# Patient Record
Sex: FEMALE | Race: WHITE
[De-identification: names, ages, dates, MRNs, and addresses within clinical notes are randomized per-mention and may not be internally consistent; named-entity substitution may affect disease eponyms.]

---

## 2020-01-20 ENCOUNTER — HOSPITAL ENCOUNTER (INPATIENT)
Dept: HOSPITAL 47 - 4FBP | Age: 25
LOS: 1 days | Discharge: HOME | End: 2020-01-21
Attending: OBSTETRICS & GYNECOLOGY | Admitting: OBSTETRICS & GYNECOLOGY
Payer: COMMERCIAL

## 2020-01-20 VITALS — RESPIRATION RATE: 16 BRPM

## 2020-01-20 DIAGNOSIS — Z82.49: ICD-10-CM

## 2020-01-20 DIAGNOSIS — Z3A.39: ICD-10-CM

## 2020-01-20 DIAGNOSIS — Z90.5: ICD-10-CM

## 2020-01-20 LAB
BASOPHILS # BLD AUTO: 0 K/UL (ref 0–0.2)
BASOPHILS NFR BLD AUTO: 0 %
EOSINOPHIL # BLD AUTO: 0.1 K/UL (ref 0–0.7)
EOSINOPHIL NFR BLD AUTO: 1 %
ERYTHROCYTE [DISTWIDTH] IN BLOOD BY AUTOMATED COUNT: 4 M/UL (ref 3.8–5.4)
ERYTHROCYTE [DISTWIDTH] IN BLOOD: 12.5 % (ref 11.5–15.5)
GLUCOSE BLD-MCNC: 80 MG/DL (ref 75–99)
HCT VFR BLD AUTO: 36.9 % (ref 34–46)
HGB BLD-MCNC: 12.8 GM/DL (ref 11.4–16)
LYMPHOCYTES # SPEC AUTO: 1.9 K/UL (ref 1–4.8)
LYMPHOCYTES NFR SPEC AUTO: 23 %
MCH RBC QN AUTO: 32 PG (ref 25–35)
MCHC RBC AUTO-ENTMCNC: 34.7 G/DL (ref 31–37)
MCV RBC AUTO: 92.1 FL (ref 80–100)
MONOCYTES # BLD AUTO: 0.4 K/UL (ref 0–1)
MONOCYTES NFR BLD AUTO: 5 %
NEUTROPHILS # BLD AUTO: 5.6 K/UL (ref 1.3–7.7)
NEUTROPHILS NFR BLD AUTO: 68 %
PLATELET # BLD AUTO: 186 K/UL (ref 150–450)
WBC # BLD AUTO: 8.2 K/UL (ref 3.8–10.6)

## 2020-01-20 PROCEDURE — 86850 RBC ANTIBODY SCREEN: CPT

## 2020-01-20 PROCEDURE — 86900 BLOOD TYPING SEROLOGIC ABO: CPT

## 2020-01-20 PROCEDURE — 86901 BLOOD TYPING SEROLOGIC RH(D): CPT

## 2020-01-20 PROCEDURE — 0HQ9XZZ REPAIR PERINEUM SKIN, EXTERNAL APPROACH: ICD-10-PCS

## 2020-01-20 PROCEDURE — 3E0R3BZ INTRODUCTION OF ANESTHETIC AGENT INTO SPINAL CANAL, PERCUTANEOUS APPROACH: ICD-10-PCS

## 2020-01-20 PROCEDURE — 00HU33Z INSERTION OF INFUSION DEVICE INTO SPINAL CANAL, PERCUTANEOUS APPROACH: ICD-10-PCS

## 2020-01-20 PROCEDURE — 85025 COMPLETE CBC W/AUTO DIFF WBC: CPT

## 2020-01-20 RX ADMIN — DOCUSATE SODIUM AND SENNOSIDES SCH EACH: 50; 8.6 TABLET ORAL at 19:58

## 2020-01-20 RX ADMIN — POTASSIUM CHLORIDE SCH MLS/HR: 14.9 INJECTION, SOLUTION INTRAVENOUS at 12:18

## 2020-01-20 RX ADMIN — POTASSIUM CHLORIDE SCH MLS/HR: 14.9 INJECTION, SOLUTION INTRAVENOUS at 06:29

## 2020-01-20 RX ADMIN — POTASSIUM CHLORIDE SCH MLS/HR: 14.9 INJECTION, SOLUTION INTRAVENOUS at 13:51

## 2020-01-20 NOTE — P.PROBDLV
Vaginal Delivery Note





- .


Vaginal Delivery Note: 





This is a 24 rolled white female  1 para 0 EDC 2020 at 39-3/7 

weeks' gestation.  Patient presented for induction for a history of nephrectomy,

and increasing proteinuria with reasonably favorable cervix.  Pregnancy is 

remarkable for gestational diabetes, diet controlled, blood sugar on admission 

80.  Rupee strep cultures negative.  Rubella status immune.  Please see 

admitting H&P for details.





Patient patient was 2-3 cm dilated, 70% effaced, -1 station, soft, anterior.  

Artificial amniorrhexis revealed clear fluid.  Oxytocin was started and titrated

per hospital protocol.  She progressed well through the first stage of labor and

requested an epidural which was placed.  She became completely dilated at 1514 

hours and began the second stage of labor at that time.





The perineal body was prepped and draped in usual sterile fashion.  Excellent 

maternal expulsive effort was employed.  Infant's head delivered occiput 

anterior and she restituted accordingly.  There was no nuchal cord noted.  The 

left or anterior shoulder was delivered from underneath the pubic symphysis at 

which time the oropharynx, nasopharynx, and external nares were all bulb 

suctioned.  Patient was officially delivered of a liveborn female infant at 15 

47 hours.  Umbilical cord was doubly clamped and ligated, she was handed to 

waiting nurses for evaluation where Apgar scores of 9 and 9 at one and 5 minutes

respectively were given.





Placenta delivered spontaneously, it was inspected and noted to be intact with 

trivascular cord at 1550 hours.  Uterus is massaged.  It is firm and in the 

midline, 16-18 week size.  Inspection of the cervix, vagina, perineum, 

periurethral, and perirectal areas revealed a small first-degree perineal 

laceration on the anterior aspect of both labia minora.  These are each repaired

with a single figure-of-eight suture of 3-0 Vicryl suture.  Infant weighs 2882 g

or 6 lbs. 3 oz.  The patient and her family are allowed to begin the bonding 

experience in the LDR.

## 2020-01-21 VITALS — DIASTOLIC BLOOD PRESSURE: 79 MMHG | TEMPERATURE: 98.2 F | SYSTOLIC BLOOD PRESSURE: 129 MMHG | HEART RATE: 74 BPM

## 2020-01-21 LAB
BASOPHILS # BLD AUTO: 0 K/UL (ref 0–0.2)
BASOPHILS NFR BLD AUTO: 0 %
EOSINOPHIL # BLD AUTO: 0.1 K/UL (ref 0–0.7)
EOSINOPHIL NFR BLD AUTO: 1 %
ERYTHROCYTE [DISTWIDTH] IN BLOOD BY AUTOMATED COUNT: 3.59 M/UL (ref 3.8–5.4)
ERYTHROCYTE [DISTWIDTH] IN BLOOD: 12.5 % (ref 11.5–15.5)
HCT VFR BLD AUTO: 33.7 % (ref 34–46)
HGB BLD-MCNC: 11.5 GM/DL (ref 11.4–16)
LYMPHOCYTES # SPEC AUTO: 1.4 K/UL (ref 1–4.8)
LYMPHOCYTES NFR SPEC AUTO: 15 %
MCH RBC QN AUTO: 31.9 PG (ref 25–35)
MCHC RBC AUTO-ENTMCNC: 34.1 G/DL (ref 31–37)
MCV RBC AUTO: 93.7 FL (ref 80–100)
MONOCYTES # BLD AUTO: 0.6 K/UL (ref 0–1)
MONOCYTES NFR BLD AUTO: 6 %
NEUTROPHILS # BLD AUTO: 7.1 K/UL (ref 1.3–7.7)
NEUTROPHILS NFR BLD AUTO: 76 %
PLATELET # BLD AUTO: 169 K/UL (ref 150–450)
WBC # BLD AUTO: 9.4 K/UL (ref 3.8–10.6)

## 2020-01-21 RX ADMIN — DOCUSATE SODIUM AND SENNOSIDES SCH EACH: 50; 8.6 TABLET ORAL at 07:55

## 2020-01-21 NOTE — P.DS
Providers


Date of admission: 


20 06:09





Expected date of discharge: 20


Attending physician: 


Enedina Rodríguez





Primary care physician: 


Stated None





Hospital Course: 





This is a 24 rolled white female  1 para 0 EDC 2020 at 39-3/7 

weeks' gestation.  Patient presented for induction for increasing proteinuria, 

nephrectomy performed 2 years ago.  Pregnancy otherwise unremarkable, rubella 

status immune, blood type O+, group B strep cultures negative.  Please see 

dictated history and physical for details.  Pregnancy is also remarkable for 

gestational diabetes, excellent blood sugar control on diet, admitting blood 

sugar 80.





Patient received an epidural per her request.  Oxytocin was started and titrated

per hospital protocol.  She went on to swiftly deliver a liveborn female infant 

with Apgar scores of 9 and 9 at one and 5 minutes respectively.  Infant weighed 

6 lbs. 3 oz. or 2882 g.  There were 2 small first-degree lacerations on either 

labia minora that were easily repaired with figure-of-eight sutures.  Estimated 

blood loss 300 mL's.  Please see dictated delivery note for details.





This morning the patient is doing well.  She is voiding, ambulating and passing 

flatus without difficulty.  Vital signs are stable and she is afebrile.  Fundus 

is firm and in the midline, symmetric and 18 week size.  Extremities are 

negative for edema.  Breasts are not engorged, breast-feeding is going well.  I 

have given her prescription for a double electric breast pump per her request.  

Patient is judged to be in very good condition for discharge home.





She will follow-up in the office with me in 6 weeks.  I reminded her no 

intercourse, tampons or douching.  She will use over-the-counter acetaminophen, 

1000 mg every 4-6 hours as needed.  She will call with any fevers shakes or 

chills, foul smelling or copious lochia, with the passage of large blood clots. 

She will call with any pain not alleviated by over-the-counter products.  We 

have briefly discussed options for contraception and we will review this further

in the office at the 6 week visit.


Patient Condition at Discharge: Good





Plan - Discharge Summary


Discharge Rx Participant: No


New Discharge Prescriptions: 


No Action


   Pnv No.95/Ferrous Fum/Folic AC [Prenatal Multivitamin Tablet] 1 each PO DAILY


Discharge Medication List





Pnv No.95/Ferrous Fum/Folic AC [Prenatal Multivitamin Tablet] 1 each PO DAILY 

20 [History]








Follow up Appointment(s)/Referral(s): 


Enedina Rodríguez MD [STAFF PHYSICIAN] - 6 Weeks


Discharge Disposition: HOME SELF-CARE

## 2021-05-28 ENCOUNTER — HOSPITAL ENCOUNTER (OUTPATIENT)
Dept: HOSPITAL 47 - FBPOP | Age: 26
Discharge: HOME | End: 2021-05-28
Attending: OBSTETRICS & GYNECOLOGY
Payer: COMMERCIAL

## 2021-05-28 VITALS — HEART RATE: 91 BPM | DIASTOLIC BLOOD PRESSURE: 75 MMHG | RESPIRATION RATE: 14 BRPM | SYSTOLIC BLOOD PRESSURE: 123 MMHG

## 2021-05-28 VITALS — TEMPERATURE: 96.6 F

## 2021-05-28 DIAGNOSIS — O24.419: Primary | ICD-10-CM

## 2021-05-28 DIAGNOSIS — Z3A.00: ICD-10-CM

## 2021-05-28 PROCEDURE — 59025 FETAL NON-STRESS TEST: CPT

## 2021-06-22 ENCOUNTER — HOSPITAL ENCOUNTER (INPATIENT)
Dept: HOSPITAL 47 - 4FBP | Age: 26
LOS: 1 days | Discharge: HOME | End: 2021-06-23
Attending: OBSTETRICS & GYNECOLOGY | Admitting: OBSTETRICS & GYNECOLOGY
Payer: COMMERCIAL

## 2021-06-22 DIAGNOSIS — Z82.49: ICD-10-CM

## 2021-06-22 DIAGNOSIS — Z3A.39: ICD-10-CM

## 2021-06-22 DIAGNOSIS — Z90.5: ICD-10-CM

## 2021-06-22 LAB
BASOPHILS # BLD AUTO: 0 K/UL (ref 0–0.2)
BASOPHILS NFR BLD AUTO: 0 %
EOSINOPHIL # BLD AUTO: 0.1 K/UL (ref 0–0.7)
EOSINOPHIL NFR BLD AUTO: 1 %
ERYTHROCYTE [DISTWIDTH] IN BLOOD BY AUTOMATED COUNT: 3.94 M/UL (ref 3.8–5.4)
ERYTHROCYTE [DISTWIDTH] IN BLOOD: 12.3 % (ref 11.5–15.5)
GLUCOSE BLD-MCNC: 77 MG/DL (ref 75–99)
HCT VFR BLD AUTO: 35.7 % (ref 34–46)
HGB BLD-MCNC: 12.3 GM/DL (ref 11.4–16)
LYMPHOCYTES # SPEC AUTO: 2.6 K/UL (ref 1–4.8)
LYMPHOCYTES NFR SPEC AUTO: 35 %
MCH RBC QN AUTO: 31.2 PG (ref 25–35)
MCHC RBC AUTO-ENTMCNC: 34.4 G/DL (ref 31–37)
MCV RBC AUTO: 90.6 FL (ref 80–100)
MONOCYTES # BLD AUTO: 0.4 K/UL (ref 0–1)
MONOCYTES NFR BLD AUTO: 5 %
NEUTROPHILS # BLD AUTO: 4.2 K/UL (ref 1.3–7.7)
NEUTROPHILS NFR BLD AUTO: 56 %
PLATELET # BLD AUTO: 163 K/UL (ref 150–450)
WBC # BLD AUTO: 7.4 K/UL (ref 3.8–10.6)

## 2021-06-22 PROCEDURE — 85025 COMPLETE CBC W/AUTO DIFF WBC: CPT

## 2021-06-22 PROCEDURE — 0HQ9XZZ REPAIR PERINEUM SKIN, EXTERNAL APPROACH: ICD-10-PCS

## 2021-06-22 PROCEDURE — 86900 BLOOD TYPING SEROLOGIC ABO: CPT

## 2021-06-22 PROCEDURE — 86850 RBC ANTIBODY SCREEN: CPT

## 2021-06-22 PROCEDURE — 86901 BLOOD TYPING SEROLOGIC RH(D): CPT

## 2021-06-22 RX ADMIN — POTASSIUM CHLORIDE SCH MLS/HR: 14.9 INJECTION, SOLUTION INTRAVENOUS at 10:53

## 2021-06-22 RX ADMIN — DOCUSATE SODIUM AND SENNOSIDES SCH EACH: 50; 8.6 TABLET ORAL at 21:37

## 2021-06-22 RX ADMIN — IBUPROFEN SCH MG: 600 TABLET ORAL at 21:37

## 2021-06-22 RX ADMIN — POTASSIUM CHLORIDE SCH MLS/HR: 14.9 INJECTION, SOLUTION INTRAVENOUS at 06:31

## 2021-06-22 RX ADMIN — POTASSIUM CHLORIDE SCH MLS/HR: 14.9 INJECTION, SOLUTION INTRAVENOUS at 08:43

## 2021-06-22 RX ADMIN — IBUPROFEN SCH MG: 600 TABLET ORAL at 13:11

## 2021-06-22 NOTE — P.PROBDLV
Vaginal Delivery Note





- .


Vaginal Delivery Note: 





This is a 26-year-old female  3 para 1011 EDC 2021 at 39 weeks 

gestation.  Patient presented this morning for induction of labor with favorable

multiparous cervix.  Positive group B strep cultures, 2 doses of penicillin 

received.  Rubella status immune, blood type O positive, blood sent to the lab 

after delivery.  Please see dictated history and physical for details.





Artificial amniorrhexis revealed clear fluid.  Oxytocin was started and titrated

per hospital protocol.  As noted above, 2 doses of penicillin were given per 

protocol.  Epidural was requested and placed without difficulties.  Patient 

progressed well to the first stage of labor and became completely dilated at 

1213 hours.  The perineal body was prepped and draped in usual sterile fashion. 

She began the second stage of labor at that time.





With excellent maternal expulsive efforts the infant's head delivered occiput 

anterior and he restituted accordingly.  The left or anterior shoulder was 

easily delivered from underneath the pubic symphysis at which time the 

oropharynx, nasopharynx, and external nares were all bulb suctioned.  Patient 

was officially delivered of a liveborn male infant at 1221 hours.  Umbilical 

cord was doubly clamped and ligated, he was handed to waiting nurses for 

evaluation where Apgar scores of 9 and 10 at one and 5 minutes respectively were

given.  Cord blood was sent to the lab for O+ blood type.  Placenta delivered 

spontaneously with active management, it was inspected and noted to be intact 

with trivascular cord at 1224 hours.





At this time careful inspection of the cervix, vagina, perineum, periurethral, 

and perirectal areas revealed a first-degree laceration at 6:00 along with a 

first-degree periurethral laceration.  Both were injected with 1% lidocaine and 

repaired easily with 3-0 repeat suture.  Good reapproximation was noted.  Infant

weighed 7 lbs. 5 oz. or 3315 g.  Total estimated blood loss 250 mL's.  Patient 

is requesting circumcision for her  infant son.

## 2021-06-22 NOTE — P.HPOB
History of Present Illness


H&P Date: 21


Chief Complaint: For elective induction of labor with favorable multiparous 

cervix





This is a 26-year-old female  3 para 1011 EDC 2021 at 39 weeks 

gestation who presents for induction of labor with favorable multiparous cervix.

 She denies vaginal bleeding or fluid leakage.  Fetus is been active throughout 

the pregnancy.





Past surgical history significant for wisdom teeth extraction, nephrectomy 2018,

thoracotomy .





Current medications prenatal vitamins daily.





ALLERGIES none known.





Family history significant for hypertension.





Past medical history is significant for spontaneous pneumothorax in .





Social history patient is , she is never been a smoker, she is a teacher 

for the [employer].





Obstetric history blood type is O+, rubella status immune.  Urine culture, 

hepatitis B surface antigen, HIV testing, gonorrhea and chlamydia cultures all 

negative.  Gestational diabetes is noted.  Group B strep cultures positive.





On exam patient is 5 foot 9 inches, 160 pounds, vital signs are stable and she 

is afebrile.  General physical exam is within normal limits.  Cervix is 4 cm 

dilated, 80% effaced, -1 station, vertex presentation.  Artificial amniorrhexis 

reveals clear fluid.  Fetal heart rate is consistent with reactive NST.





Impression: 39 week intrauterine pregnancy, here for induction of labor, all 

signs reassuring.





Plan: Analgesic options reviewed.  Oxytocin per hospital protocol.  Penicillin G

per hospital protocol.  Continue close maternal and fetal surveillance.  

Anticipate normal spontaneous vaginal delivery.





Review of Systems


Constitutional: Reports as per HPI





Past Medical History


Additional Past Medical History / Comment(s): Right uretheral stricture, left 

spontaneous pneumothorax x 3, hx GDM


History of Any Multi-Drug Resistant Organisms: None Reported


Additional Past Surgical History / Comment(s): Left vats with wedge resection., 

Right kidney removed


Past Anesthesia/Blood Transfusion Reactions: Postoperative Nausea & Vomiting 

(PONV)


Additional Past Anesthesia/Blood Transfusion Reaction / Comment(s): Slow to 

awaken.


Past Psychological History: No Psychological Hx Reported


Smoking Status: Never smoker


Past Drug Use History: None Reported





- Past Family History


  ** Mother


Family Medical History: No Reported History





  ** Father


Family Medical History: No Reported History





Medications and Allergies


                                Home Medications











 Medication  Instructions  Recorded  Confirmed  Type


 


Pnv No.95/Ferrous Fum/Folic AC 1 each PO DAILY 20 History





[Prenatal Multivitamin Tablet]    








                                    Allergies











Allergy/AdvReac Type Severity Reaction Status Date / Time


 


No Known Allergies Allergy   Verified 21 06:30














Exam


                                   Vital Signs











  Temp Pulse Resp BP Pulse Ox


 


 21 06:48  97.6 F  98  18  136/89  99








                                Intake and Output











 21





 22:59 06:59 14:59


 


Other:   


 


  Weight  72.575 kg 














See dictation under HPI please





Results


Result Diagrams: 


                                 21 06:25








Assessment and Plan


Assessment: 





39 week intrauterine pregnancy, gestational diabetes, positive group B strep 

cultures, here for induction of labor, favorable cervix, all signs reassuring.


Plan: 





Oxytocin per hospital protocol.  Penicillin G per hospital protocol.  Close 

maternal and fetal surveillance.  Anticipate normal spontaneous vaginal 

delivery.


Time with Patient: Less than 30

## 2021-06-23 VITALS
HEART RATE: 80 BPM | RESPIRATION RATE: 16 BRPM | DIASTOLIC BLOOD PRESSURE: 61 MMHG | SYSTOLIC BLOOD PRESSURE: 105 MMHG | TEMPERATURE: 98 F

## 2021-06-23 LAB
BASOPHILS # BLD AUTO: 0 K/UL (ref 0–0.2)
BASOPHILS NFR BLD AUTO: 0 %
EOSINOPHIL # BLD AUTO: 0.1 K/UL (ref 0–0.7)
EOSINOPHIL NFR BLD AUTO: 1 %
ERYTHROCYTE [DISTWIDTH] IN BLOOD BY AUTOMATED COUNT: 3.55 M/UL (ref 3.8–5.4)
ERYTHROCYTE [DISTWIDTH] IN BLOOD: 12.4 % (ref 11.5–15.5)
HCT VFR BLD AUTO: 32.4 % (ref 34–46)
HGB BLD-MCNC: 11.3 GM/DL (ref 11.4–16)
LYMPHOCYTES # SPEC AUTO: 2.4 K/UL (ref 1–4.8)
LYMPHOCYTES NFR SPEC AUTO: 29 %
MCH RBC QN AUTO: 32 PG (ref 25–35)
MCHC RBC AUTO-ENTMCNC: 35 G/DL (ref 31–37)
MCV RBC AUTO: 91.3 FL (ref 80–100)
MONOCYTES # BLD AUTO: 0.4 K/UL (ref 0–1)
MONOCYTES NFR BLD AUTO: 5 %
NEUTROPHILS # BLD AUTO: 5.4 K/UL (ref 1.3–7.7)
NEUTROPHILS NFR BLD AUTO: 64 %
PLATELET # BLD AUTO: 135 K/UL (ref 150–450)
WBC # BLD AUTO: 8.4 K/UL (ref 3.8–10.6)

## 2021-06-23 RX ADMIN — IBUPROFEN SCH: 600 TABLET ORAL at 00:46

## 2021-06-23 RX ADMIN — DOCUSATE SODIUM AND SENNOSIDES SCH EACH: 50; 8.6 TABLET ORAL at 07:45

## 2021-06-23 RX ADMIN — IBUPROFEN SCH MG: 600 TABLET ORAL at 07:45

## 2021-06-23 RX ADMIN — IBUPROFEN SCH: 600 TABLET ORAL at 04:50

## 2021-06-23 NOTE — P.DS
Providers


Date of admission: 


21 06:10





Expected date of discharge: 21


Attending physician: 


Enedina Rodríguez





Primary care physician: 


Stated None





Hospital Course: 





This is a 26-year-old female  3 para 1011 EDC 2021 at 39 weeks 

gestation.  Patient presented yesterday for induction of labor with favorable 

multiparous cervix, rubella status immune, blood type O positive, group B strep 

cultures positive.  Please see dictated history and physical for details.





Oxytocin was started and titrated per protocol.  Penicillin G was given 2 doses

per protocol.  Patient went on to swiftly deliver vaginally a liveborn male 

infant with Apgar scores of 9 and 10 at one and 5 minutes respectively.  He 

weighed 3315 g or 7 lbs. 5 oz.  There was a small first-degree perineal 

laceration easily repaired.  Estimated blood loss 250 mL's.  Please see my 

dictated delivery note for details.





This morning the patient is doing very well.  She is voiding, ambulating, 

passing flatus without difficulty.  Vital signs are stable and she is midline, 

symmetric and 18 week size.  Extremities are negative for edema.   

circumcision has been performed and he is doing well also.  Patient is judged to

be in excellent condition for discharge home.





She will follow-up with me in the office in 6 weeks.  I have reminded her no 

intercourse, tampons or douching.  She will use over-the-counter extra strength 

Tylenol as needed for pain.  A breast pump has been written and field.  Patient 

will call with any fevers shakes or chills, foul smelling or copious lochia, 

with the passage of large blood clots, with any pain not alleviated by over-the-

counter products, or indeed with any concerns.


Assessment: 


Doing very well postpartum day #1


Patient Condition at Discharge: Good





Plan - Discharge Summary


Discharge Rx Participant: No


New Discharge Prescriptions: 


No Action


   Pnv No.95/Ferrous Fum/Folic AC [Prenatal Multivitamin Tablet] 1 each PO DAILY


Discharge Medication List





Pnv No.95/Ferrous Fum/Folic AC [Prenatal Multivitamin Tablet] 1 each PO DAILY 

20 [History]








Follow up Appointment(s)/Referral(s): 


Enedina Rodríguez MD [STAFF PHYSICIAN] - 6 Weeks


Discharge Disposition: HOME SELF-CARE

## 2023-04-13 ENCOUNTER — HOSPITAL ENCOUNTER (INPATIENT)
Dept: HOSPITAL 47 - 4FBP | Age: 28
LOS: 1 days | Discharge: HOME | End: 2023-04-14
Attending: OBSTETRICS & GYNECOLOGY | Admitting: OBSTETRICS & GYNECOLOGY
Payer: COMMERCIAL

## 2023-04-13 DIAGNOSIS — Z79.82: ICD-10-CM

## 2023-04-13 DIAGNOSIS — Z3A.39: ICD-10-CM

## 2023-04-13 DIAGNOSIS — Z90.5: ICD-10-CM

## 2023-04-13 LAB
BASOPHILS # BLD AUTO: 0 K/UL (ref 0–0.2)
BASOPHILS NFR BLD AUTO: 0 %
EOSINOPHIL # BLD AUTO: 0.1 K/UL (ref 0–0.7)
EOSINOPHIL NFR BLD AUTO: 1 %
ERYTHROCYTE [DISTWIDTH] IN BLOOD BY AUTOMATED COUNT: 4.2 M/UL (ref 3.8–5.4)
ERYTHROCYTE [DISTWIDTH] IN BLOOD: 12.3 % (ref 11.5–15.5)
HCT VFR BLD AUTO: 37.5 % (ref 34–46)
HGB BLD-MCNC: 12.4 GM/DL (ref 11.4–16)
LYMPHOCYTES # SPEC AUTO: 1.7 K/UL (ref 1–4.8)
LYMPHOCYTES NFR SPEC AUTO: 26 %
MCH RBC QN AUTO: 29.6 PG (ref 25–35)
MCHC RBC AUTO-ENTMCNC: 33.1 G/DL (ref 31–37)
MCV RBC AUTO: 89.5 FL (ref 80–100)
MONOCYTES # BLD AUTO: 0.3 K/UL (ref 0–1)
MONOCYTES NFR BLD AUTO: 5 %
NEUTROPHILS # BLD AUTO: 4.4 K/UL (ref 1.3–7.7)
NEUTROPHILS NFR BLD AUTO: 67 %
PLATELET # BLD AUTO: 168 K/UL (ref 150–450)
WBC # BLD AUTO: 6.7 K/UL (ref 3.8–10.6)

## 2023-04-13 PROCEDURE — 83036 HEMOGLOBIN GLYCOSYLATED A1C: CPT

## 2023-04-13 PROCEDURE — 4A0HXCZ MEASUREMENT OF PRODUCTS OF CONCEPTION, CARDIAC RATE, EXTERNAL APPROACH: ICD-10-PCS

## 2023-04-13 PROCEDURE — 86901 BLOOD TYPING SEROLOGIC RH(D): CPT

## 2023-04-13 PROCEDURE — 86900 BLOOD TYPING SEROLOGIC ABO: CPT

## 2023-04-13 PROCEDURE — 0HQ9XZZ REPAIR PERINEUM SKIN, EXTERNAL APPROACH: ICD-10-PCS

## 2023-04-13 PROCEDURE — 85025 COMPLETE CBC W/AUTO DIFF WBC: CPT

## 2023-04-13 PROCEDURE — 86850 RBC ANTIBODY SCREEN: CPT

## 2023-04-13 PROCEDURE — 10907ZC DRAINAGE OF AMNIOTIC FLUID, THERAPEUTIC FROM PRODUCTS OF CONCEPTION, VIA NATURAL OR ARTIFICIAL OPENING: ICD-10-PCS

## 2023-04-13 RX ADMIN — DOCUSATE SODIUM AND SENNOSIDES SCH EACH: 50; 8.6 TABLET ORAL at 20:39

## 2023-04-13 RX ADMIN — POTASSIUM CHLORIDE SCH MLS/HR: 14.9 INJECTION, SOLUTION INTRAVENOUS at 13:07

## 2023-04-13 RX ADMIN — IBUPROFEN PRN MG: 600 TABLET ORAL at 23:30

## 2023-04-13 RX ADMIN — POTASSIUM CHLORIDE SCH MLS/HR: 14.9 INJECTION, SOLUTION INTRAVENOUS at 10:02

## 2023-04-13 NOTE — P.HPOB
History of Present Illness


H&P Date: 23


Chief Complaint: Fetal tachycardia noted in the office





This is a 27-year-old female  4 para 2012 EDC 2023 at 39 weeks 

gestation.  Patient was in the office this morning for her routine obstetrical 

visit, on the NST for gestational diabetes.  Blood sugar this morning 78.  After

30-40 minutes fetal tachycardia with sustained in the 180s with no variability. 

Patient was brought to the hospital for induction of labor with favorable 

multiparous cervix.





Past medical history is significant for gestational diabetes and spontaneous 

tension pneumothorax in .





Past surgical history right nephrectomy , thoracotomy .





Current medications prenatal vitamins daily, baby aspirin daily.





ALLERGIES none known.





Family history significant for hypertension.





Obstetric history normal spontaneous vaginal deliveries 2, unremarkable area 

she had a missed AB not requiring surgery.





Social history patient has never been a smoker, she is , she denies 

tobacco alcohol or drug use.





Prenatal history is significant for blood type O+, rubella status immune.  VDRL 

testing, urine culture, gonorrhea and chlamydia cultures, HIV testing, hepatitis

B surface antigen all negative.  Three-hour GTT consistent with gestational 

diabetes.  Group B strep cultures negative.





On exam patient is 5 foot 9 inches, 156 pounds, blood pressure 140/91 on 

admission.  The general physical exam is within normal limits.  Cervix is 5 cm 

dilated, 80% effaced, -1 station, vertex presentation.  Artificial amniorrhexis 

reveals clear fluid.  Fetal heart tones here in the 150s baseline with 

accelerations up to the 180s, overall excellent variability.





Plan: 39 week intrauterine pregnancy, fetal tachycardia in the office, 

nonsustained.  Clear fluid.  All signs otherwise reassuring.  Gestational 

diabetes with blood sugar this morning 78.





Plan: Oxytocin per hospital protocol.  Close maternal and fetal surveillance.  

Analgesic options reviewed.  Anticipate normal spontaneous vaginal delivery.





Review of Systems


Constitutional: Reports as per HPI





Past Medical History


Additional Past Medical History / Comment(s): Right uretheral stricture, left 

spontaneous pneumothorax x 3, hx GDM


History of Any Multi-Drug Resistant Organisms: None Reported


Additional Past Surgical History / Comment(s): Left vats with wedge resection., 

Right kidney removed


Past Anesthesia/Blood Transfusion Reactions: Postoperative Nausea & Vomiting 

(PONV)


Additional Past Anesthesia/Blood Transfusion Reaction / Comment(s): Slow to 

awaken.


Past Psychological History: No Psychological Hx Reported


Smoking Status: Never smoker


Past Drug Use History: None Reported





- Past Family History


  ** Mother


Family Medical History: No Reported History





  ** Father


Family Medical History: No Reported History





Medications and Allergies


                                Home Medications











 Medication  Instructions  Recorded  Confirmed  Type


 


Pnv No.95/Ferrous Fum/Folic AC 1 each PO DAILY 20 History





[Prenatal Multivitamin Tablet]    








                                    Allergies











Allergy/AdvReac Type Severity Reaction Status Date / Time


 


No Known Allergies Allergy   Verified 23 09:39














Exam


                                Intake and Output











 23





 22:59 06:59 14:59


 


Other:   


 


  Weight   70.76 kg














Assessment and Plan


Assessment: 





39 week intrauterine pregnancy, gestational diabetes, fetal tachycardia in the 

office, heart tones reassuring at this time.


Plan: 





Oxytocin per hospital protocol.  Continue close maternal and fetal surveillance.

 Analgesic options reviewed.  Anticipate normal spontaneous vaginal delivery.


Time with Patient: Less than 30

## 2023-04-13 NOTE — P.PROBDLV
Vaginal Delivery Note





- .


Vaginal Delivery Note: 





This is a 27-year-old  4 para 2012 EDC 2023 at 39 weeks gestation 

who was seen in the office and noted to have prolonged fetal tachycardia.  She 

was brought to labor and delivery for induction of labor with favorable cervix, 

5 cm 80% -1.  Blood pressure on admission 140/91.  Pregnancy remarkable for 

gestational diabetes, blood sugars 78.  Please see my dictated history and 

physical for details.





Artificial amniorrhexis revealed clear fluid.  Oxytocin was started and titrated

per hospital protocol.  There were several episodes of fetal decelerations which

were corrected with maternal position change, oxygen, IV fluids, and cessation 

of Pitocin.  Patient was to receive an epidural but due to heart tones declined.





She became completely dilated at 1457 hrs. and began the second stage of labor 

at that time.  The perineal body was prepped and draped in usual sterile 

fashion.  Infant's head delivered straight occiput posterior, baby restituted 

accordingly.  There was no nuchal cord noted.  The left or anterior shoulder was

gently delivered from underneath the pubic symphysis at which time the 

oropharynx, nasopharynx, and external nares were all bulb suctioned. patient was

officially delivered of a liveborn female infant at 1511 hours.  Umbilical cord 

was doubly clamped and ligated, she was handed to waiting nurses for evaluation 

where Apgar scores of 7 and 8 at one and 5 minutes respectively were given.





Placenta delivered spontaneously, it was inspected and noted to be intact with 

trivascular cord at 1514 hours.  Uterus is then massaged.  Careful inspection of

the cervix, vagina, perineum, periurethral, and perirectal areas revealed a 

small first-degree perineal laceration.  This was injected with lidocaine and 

repaired in the usual fashion with a single figure-of-eight suture of 4-0 

repeat.  Total estimated blood loss 200 mL's.  All sponge needle and enhancement

counts are correct.  Infant weight 3230 g or 7 lbs. 2 oz.  Patient and her 

family are allowed to begin the bonding experience in the LDR.

## 2023-04-14 VITALS
HEART RATE: 77 BPM | DIASTOLIC BLOOD PRESSURE: 81 MMHG | RESPIRATION RATE: 26 BRPM | TEMPERATURE: 98.6 F | SYSTOLIC BLOOD PRESSURE: 127 MMHG

## 2023-04-14 RX ADMIN — DOCUSATE SODIUM AND SENNOSIDES SCH EACH: 50; 8.6 TABLET ORAL at 08:04

## 2023-04-14 RX ADMIN — IBUPROFEN PRN MG: 600 TABLET ORAL at 08:04

## 2023-04-14 NOTE — P.DS
Providers


Date of admission: 


23 09:36





Expected date of discharge: 23


Attending physician: 


Enedina Rodríguez





Primary care physician: 


Stated None





Hospital Course: 





This is a 27-year-old female  4 para 2012 EDC 2023 at 39 weeks 

gestation who was sent over from the office with nonreassuring fetal heart tones

for induction of labor and favorable multiparous cervix.  [place] strep cultures 

negative.  Please see dictated history and physical for details.





Artificial amniorrhexis revealed clear fluid.  Oxytocin was started and titrated

per hospital protocol.  Patient went on to deliver vaginally a liveborn female 

infant with Apgar scores of 7 and 8 at one and 5 minutes respectively.  Infant 

weighed 7 lbs. 2 oz. or 3230 g.  Patient did well with a first-degree perineal 

laceration easily repaired.  Please see dictated delivery note for details.





This morning the patient is doing well.  She is voiding, ambulating, passing 

flatus without difficulty.  Vital signs are stable and she has remained 

afebrile.   infant is doing very well.  Breast-feeding is going well.  

Patient is judged to be in excellent condition for discharge home.





She will follow-up with me in the office in 6 weeks.  I have reminded her no int

ercourse, tampons or douching.  She will use over-the-counter Advil or Aleve as 

needed for pain.  She will call with any fevers shakes or chills, foul smelling 

or copious lochia, with the passage of large blood clots, with any pain not 

alleviated by over-the-counter products, or indeed with any concerns.  We have 

discussed contraceptive options and we'll discuss this further in the office.


Assessment: 


Doing well first postpartum day


Patient Condition at Discharge: Good





Plan - Discharge Summary


Discharge Rx Participant: No


New Discharge Prescriptions: 


No Action


   Pnv No.95/Ferrous Fum/Folic AC [Prenatal Multivitamin Tablet] 1 each PO DAILY


Discharge Medication List





Pnv No.95/Ferrous Fum/Folic AC [Prenatal Multivitamin Tablet] 1 each PO DAILY 

20 [History]








Follow up Appointment(s)/Referral(s): 


Enedina Rodríguez MD [STAFF PHYSICIAN] - 6 Weeks


Discharge Disposition: HOME SELF-CARE

## 2023-12-12 NOTE — P.HPOB
History of Present Illness


H&P Date: 20





This is a 24 rolled white female  1 para 0 EDC 2020 at 39-3/7 

weeks' gestation.  Patient presents today for induction for elevated proteinuria

and reasonably favorable multiparous cervix.  Most recent 24 hour urine revealed

elevated protein, patient has a history of renal disease with 1 kidney already 

removed.  In addition she has gestational diabetes, home blood sugars have been 

in the normal range.  She denies fluid leakage or vaginal bleeding.  Fetus is 

been active throughout the pregnancy.  Blood sugar this morning 80.





Past medical history is significant for spontaneous tension pneumothorax in the 

past.





Past surgical history kidney removed , thoracotomy .





Current medications prenatal vitamins.





ALLERGIES none known.





Family history significant for hypertension.





Social history patient is , she has never been a smoker, she is a teacher

for the [employer].





Prenatal history is significant for elevated proteinuria followed each trimester

with 24 hour urine.  Blood type O positive, rubella status immune.  VDRL 

testing, urine culture, hepatitis B surface antigen, HIV testing, gonorrhea and 

chlamydia cultures all negative.  Estimated fetal weight 23rd percentile.  One-

hour Glucola 220, three-hour GTT consistent with gestational diabetes.





On exam this is a pleasant white female who is 5 foot 10 inches, 157 pounds, 

blood pressure 124/88, pulse 107 on admission.  The general physical exam is 

within normal limits.  Cervix is 3 cm dilated, 70% effaced, -1 station, vertex 

presentation.  Artificial amniorrhexis reveals clear fluid.  Fetal heart rate is

consistent with reactive NST with a baseline in the 140s.





Impression: 39-3/7 weeks, gestational diabetes with good sugar control, 

proteinuria, history of nephrectomy.  Here for induction of labor.





Plan: Continue close maternal and fetal surveillance.  Oxytocin per hospital p

rotocol.  Analgesic options reviewed.  Anticipate normal spontaneous vaginal 

delivery.





Review of Systems


Constitutional: Reports as per HPI





Past Medical History


Additional Past Medical History / Comment(s): Right uretheral stricture, left 

spontaneous pneumothorax x 3, jaundice as . GDM 19


History of Any Multi-Drug Resistant Organisms: None Reported


Additional Past Surgical History / Comment(s): Left vats with wedge resection., 

Right kidney removed


Past Anesthesia/Blood Transfusion Reactions: Postoperative Nausea & Vomiting 

(PONV)


Additional Past Anesthesia/Blood Transfusion Reaction / Comment(s): Slow to 

awaken.


Past Psychological History: No Psychological Hx Reported


Additional Psychological History / Comment(s): Pt resides alone.  She is 

independent.


Smoking Status: Never smoker


Past Alcohol Use History: Occasional


Past Drug Use History: None Reported





- Past Family History


  ** Mother


Family Medical History: No Reported History





  ** Father


Family Medical History: No Reported History





Medications and Allergies


                                Home Medications











 Medication  Instructions  Recorded  Confirmed  Type


 


Pnv No.95/Ferrous Fum/Folic AC 1 each PO DAILY 20 History





[Prenatal Multivitamin Tablet]    








                                    Allergies











Allergy/AdvReac Type Severity Reaction Status Date / Time


 


No Known Allergies Allergy   Verified 20 06:22














Exam


                                   Vital Signs











  Temp Pulse Resp BP


 


 20 06:24  97.8 F  101 H  16  124/88








                                Intake and Output











 20





 22:59 06:59 14:59


 


Other:   


 


  Weight  71.214 kg 














See dictation under HPI please





Results


Result Diagrams: 


                                 20 06:30








Assessment and Plan


Assessment: 





39-3/7 weeks intrauterine pregnancy, history of nephrectomy with proteinuria 

noted, gestational diabetes with good blood sugar control.


Plan: 





Oxytocin per hospital protocol.  Continue close maternal and fetal surveillance.

 Anticipate normal spontaneous vaginal delivery.


Time with Patient: Less than 30
No